# Patient Record
Sex: FEMALE | Race: WHITE
[De-identification: names, ages, dates, MRNs, and addresses within clinical notes are randomized per-mention and may not be internally consistent; named-entity substitution may affect disease eponyms.]

---

## 2017-01-01 ENCOUNTER — HOSPITAL ENCOUNTER (EMERGENCY)
Dept: HOSPITAL 45 - C.EDB | Age: 42
Discharge: HOME | End: 2017-01-01
Payer: COMMERCIAL

## 2017-01-01 VITALS
BODY MASS INDEX: 21.09 KG/M2 | HEIGHT: 62.99 IN | WEIGHT: 119.05 LBS | WEIGHT: 119.05 LBS | HEIGHT: 62.99 IN | BODY MASS INDEX: 21.09 KG/M2

## 2017-01-01 VITALS — OXYGEN SATURATION: 99 % | SYSTOLIC BLOOD PRESSURE: 112 MMHG | HEART RATE: 60 BPM | DIASTOLIC BLOOD PRESSURE: 70 MMHG

## 2017-01-01 VITALS — TEMPERATURE: 98.06 F

## 2017-01-01 DIAGNOSIS — Z82.49: ICD-10-CM

## 2017-01-01 DIAGNOSIS — Z80.9: ICD-10-CM

## 2017-01-01 DIAGNOSIS — Z83.6: ICD-10-CM

## 2017-01-01 DIAGNOSIS — Z90.89: ICD-10-CM

## 2017-01-01 DIAGNOSIS — Z98.890: ICD-10-CM

## 2017-01-01 DIAGNOSIS — B08.4: Primary | ICD-10-CM

## 2017-01-01 NOTE — EMERGENCY ROOM VISIT NOTE
History


Report prepared by Neha:  Alda Cast


Under the Supervision of:  Dr. Andres Schulz D.O.


First contact with patient:  06:37


Chief Complaint:  RASH


Stated Complaint:  RASH





History of Present Illness


The patient is a 41 year old female who presents to the Emergency Room with 

complaints of a worsening rash that started 3 days ago. The patient states that 

the rash is on her hands and feet. It is worse on her hands. She noticed that 

the rash worsened this morning after washing and drying her hands. The rash was 

pruritic yesterday but she states that it does not feel pruritic today. She 

states that she first noticed the rash 3 days ago when there was a spot on her 

left ring finger. The patient is also experiencing a sore throat but she states 

that she was sick last week. She attempted to drain the spot because she did 

not know what it was. Otherwise, none of the rash has drained on its own. The 

patient is unsure where she got the rash but she researched it on the internet 

and believes that she has hand, foot, and mouth





   Source of History:  patient


   Onset:  3 days ago


   Position:  hand (bilateral), foot (bilateral)


   Quality:  other (rash)


   Timing:  worsening


   Associated Symptoms:  + sorethroat


Note:


pruritus yesterday but none today





Review of Systems


See HPI for pertinent positives & negatives. A total of 10 systems reviewed and 

were otherwise negative.





Past Medical & Surgical


Surgical Problems:


(1) H/O lumpectomy


(2) History of ear surgery


(3) Hx of tonsillectomy








Family History





Cancer


Hypertension


Lung disease





Social History


Smoking Status:  Never Smoker





Current/Historical Medications


No Active Prescriptions or Reported Meds





Allergies


Coded Allergies:  


     No Known Allergies (Verified , 1/1/17)





Physical Exam


Vital Signs











  Date Time  Temp Pulse Resp B/P Pulse Ox O2 Delivery O2 Flow Rate FiO2


 


1/1/17 07:21  60 18 112/70 99   


 


1/1/17 06:27 36.7 66 18 116/77 100 Room Air  











Physical Exam


CONSTITUTIONAL/VITAL SIGNS: Reviewed / noted above.


GENERAL: Non-toxic in appearance. 


INTEGUMENTARY: Warm, dry, and Pink. Blanching punctate macular rash on 

bilateral hands and feet.


HEAD: Normocephalic.


EYES: without scleral icterus or trauma.


ENT/OROPHARYNX: clear and moist. Macular rash with small vesicles in posterior 

oropharynx.


LYMPHADENOPATHY/NECK: Is supple without lymphadenopathy or meningismus.


RESPIRATORY: Lungs clear and equal.


CARDIOVASCULAR: Regular rate and rhythm.


GI/ABDOMEN: Soft and nontender. No organomegaly or pulsatile mass. No rebound 

or guarding. Normal bowel sounds.


EXTREMITIES: Warm and well perfused. Blanching punctate macular rash on 

bilateral hands and feet.


BACK: No CVA tenderness.


NEUROLOGICAL: Intact without focal deficits. 


PSYCHIATRIC: normal affect.


MUSCULOSKELETAL: Normally developed with good muscle tone.





Medical Decision & Procedures


ED Course


0647: Previous medical records were reviewed. The patient was evaluated in room 

B10. A complete history and physical examination was performed.





0700: On reevaluation, the patient is doing well. I discussed the results and 

findings with the patient. She verbalized agreement of the treatment plan. She 

was discharged home.





Medical Decision


Differential includes viral syndrome, hand, foot, and mouth disease, strep 

pharyngitis, bacterial infection.





This is a 41-year-old female who presents to the ED with the above rash.  The 

patient has a vesicular/macular rash in her throat as well as a macular rash on 

her hands and feet.  This is consistent with a hand foot and mouth syndrome.  

The patient has had the symptoms for about 3-4 days.  She works in the hospital 

taking care of new born infants.  She was given off the next 4 days of work as 

to not infect the babies.  Symptomatic care recommended.





Impression





 Primary Impression:  Hand, foot and mouth disease





Scribe Attestation


The scribe's documentation has been prepared under my direction and personally 

reviewed by me in its entirety. I confirm that the note above accurately 

reflects all work, treatment, procedures, and medical decision making performed 

by me.





Departure Information


Dispostion


Home / Self-Care





Prescriptions





No Active Prescriptions or Reported Meds





Referrals


Michelle Trujillo MD (PCP)





Forms


HOME CARE DOCUMENTATION FORM,                                                 

               IMPORTANT VISIT INFORMATION, WORK / SCHOOL INSTRUCTIONS





Patient Instructions


A Signature Page, Disease Hand Foot Mouth Ch, [facility]





Additional Instructions





Tylenol/Motrin for fevers or pain. 


May return to work on Friday.

## 2017-07-12 ENCOUNTER — HOSPITAL ENCOUNTER (OUTPATIENT)
Dept: HOSPITAL 45 - C.MAMM | Age: 42
Discharge: HOME | End: 2017-07-12
Attending: OBSTETRICS & GYNECOLOGY
Payer: COMMERCIAL

## 2017-07-12 DIAGNOSIS — Z12.31: Primary | ICD-10-CM

## 2017-07-12 NOTE — MAMMOGRAPHY REPORT
BILATERAL DIGITAL SCREENING MAMMOGRAM TOMOSYNTHESIS WITH CAD: 7/12/2017

CLINICAL HISTORY: Routine screening.  





TECHNIQUE:  Breast tomosynthesis in addition to standard 2D mammography was performed. Current study 
was also evaluated with a Computer Aided Detection (CAD) system.  



COMPARISON: Comparison is made to exams dated:  7/8/2016 mammogram, 7/7/2015 mammogram, 5/21/2014 raphael
mogram, and 5/10/2013 mammogram - [facility].   



BREAST COMPOSITION:  The tissue of both breasts is extremely dense, which lowers the sensitivity of m
ammography.  



FINDINGS:  No suspicious masses, calcifications, or areas of architectural distortion are noted in ei
ther breast. There has been no significant interval change compared to prior exams.  Bilateral benign
-appearing calcifications are not significantly changed.  A linear scar marker denotes a scar on the 
right upper outer breast.



IMPRESSION:  ACR BI-RADS CATEGORY 2: BENIGN

There is no mammographic evidence of malignancy. A 1 year screening mammogram is recommended.  The pa
tient will receive written notification of the results.  





Approximately 10% of breast cancers are not detected with mammography. A negative mammographic report
 should not delay biopsy if a clinically suggestive mass is present.



Sadie Santos M.D.          

ah/:7/12/2017 09:20:05  



Imaging Technologist: Kaiden LEMUS(R)(), [facility]

letter sent: Normal 1/2  

BI-RADS Code: ACR BI-RADS Category 2: Benign

## 2017-08-06 ENCOUNTER — HOSPITAL ENCOUNTER (OUTPATIENT)
Dept: HOSPITAL 45 - C.LAB | Age: 42
Discharge: HOME | End: 2017-08-06
Attending: NURSE PRACTITIONER
Payer: COMMERCIAL

## 2017-08-06 DIAGNOSIS — Z13.1: Primary | ICD-10-CM

## 2017-08-06 DIAGNOSIS — Z13.220: ICD-10-CM

## 2017-08-06 LAB
ANION GAP SERPL CALC-SCNC: 3 MMOL/L (ref 3–11)
BUN SERPL-MCNC: 10 MG/DL (ref 7–18)
BUN/CREAT SERPL: 11.8 (ref 10–20)
CALCIUM SERPL-MCNC: 9 MG/DL (ref 8.5–10.1)
CHLORIDE SERPL-SCNC: 107 MMOL/L (ref 98–107)
CHOLEST/HDLC SERPL: 2.6 {RATIO}
CO2 SERPL-SCNC: 30 MMOL/L (ref 21–32)
CREAT SERPL-MCNC: 0.84 MG/DL (ref 0.6–1.2)
GLUCOSE SERPL-MCNC: 87 MG/DL (ref 70–99)
GLUCOSE UR QL: 57 MG/DL
KETONES UR QL STRIP: 74 MG/DL
NITRITE UR QL STRIP: 99 MG/DL (ref 0–150)
PH UR: 151 MG/DL (ref 0–200)
POTASSIUM SERPL-SCNC: 4.4 MMOL/L (ref 3.5–5.1)
SODIUM SERPL-SCNC: 140 MMOL/L (ref 136–145)
VERY LOW DENSITY LIPOPROT CALC: 20 MG/DL

## 2018-08-15 ENCOUNTER — HOSPITAL ENCOUNTER (OUTPATIENT)
Dept: HOSPITAL 45 - C.LAB | Age: 43
Discharge: HOME | End: 2018-08-15
Attending: NURSE PRACTITIONER
Payer: COMMERCIAL

## 2018-08-15 DIAGNOSIS — Z00.00: Primary | ICD-10-CM

## 2018-08-15 LAB
KETONES UR QL STRIP: 84 MG/DL
PH UR: 171 MG/DL (ref 0–200)